# Patient Record
Sex: MALE | Employment: OTHER | URBAN - METROPOLITAN AREA
[De-identification: names, ages, dates, MRNs, and addresses within clinical notes are randomized per-mention and may not be internally consistent; named-entity substitution may affect disease eponyms.]

---

## 2022-06-06 ENCOUNTER — TELEPHONE (OUTPATIENT)
Dept: SPORTS MEDICINE | Facility: CLINIC | Age: 37
End: 2022-06-06

## 2022-06-06 DIAGNOSIS — Z02.5 SPORTS PHYSICAL: Primary | ICD-10-CM

## 2022-06-06 DIAGNOSIS — Z00.00 ROUTINE GENERAL MEDICAL EXAMINATION AT A HEALTH CARE FACILITY: Primary | ICD-10-CM

## 2022-06-13 ENCOUNTER — HOSPITAL ENCOUNTER (OUTPATIENT)
Dept: CARDIOLOGY | Facility: HOSPITAL | Age: 37
Discharge: HOME | End: 2022-06-13
Attending: INTERNAL MEDICINE

## 2022-06-13 ENCOUNTER — CLINICAL SUPPORT (OUTPATIENT)
Dept: INTERNAL MEDICINE | Facility: CLINIC | Age: 37
End: 2022-06-13

## 2022-06-13 VITALS
SYSTOLIC BLOOD PRESSURE: 118 MMHG | BODY MASS INDEX: 28.49 KG/M2 | DIASTOLIC BLOOD PRESSURE: 69 MMHG | WEIGHT: 199 LBS | HEIGHT: 70 IN | HEART RATE: 55 BPM

## 2022-06-13 DIAGNOSIS — Z00.00 ROUTINE GENERAL MEDICAL EXAMINATION AT A HEALTH CARE FACILITY: ICD-10-CM

## 2022-06-13 PROCEDURE — 94621 CARDIOPULM EXERCISE TESTING: CPT | Mod: 26,,, | Performed by: INTERNAL MEDICINE

## 2022-06-13 PROCEDURE — 94621 CARDIOPULMONARY EXERCISE TESTING (CUPID ONLY): ICD-10-PCS | Mod: 26,,, | Performed by: INTERNAL MEDICINE

## 2022-06-13 PROCEDURE — 94621 CARDIOPULM EXERCISE TESTING: CPT

## 2022-06-14 ENCOUNTER — CLINICAL SUPPORT (OUTPATIENT)
Dept: REHABILITATION | Facility: HOSPITAL | Age: 37
End: 2022-06-14
Attending: ORTHOPAEDIC SURGERY

## 2022-06-14 DIAGNOSIS — Z02.5 SPORTS PHYSICAL: ICD-10-CM

## 2022-06-14 DIAGNOSIS — M25.522 LEFT ELBOW PAIN: ICD-10-CM

## 2022-06-14 DIAGNOSIS — M25.622 ELBOW STIFFNESS, LEFT: ICD-10-CM

## 2022-06-14 PROCEDURE — 97161 PT EVAL LOW COMPLEX 20 MIN: CPT | Performed by: PHYSICAL THERAPIST

## 2022-06-15 PROBLEM — M25.622 ELBOW STIFFNESS, LEFT: Status: ACTIVE | Noted: 2022-06-15

## 2022-06-15 PROBLEM — M25.522 LEFT ELBOW PAIN: Status: ACTIVE | Noted: 2022-06-15

## 2022-06-15 NOTE — PLAN OF CARE
[facility]  Physical Therapy Initial Evaluation    Name: Nando Rizzo  Clinic Number: [id number]    Therapy Diagnosis:   Encounter Diagnoses   Name Primary?    Sports physical     Left elbow pain     Elbow stiffness, left      Physician: Armaan Gallagher MD    Physician Orders: PT Eval and Treat   Medical Diagnosis from Referral: eval and treat  Evaluation Date: 6/14/2022  Authorization Period Expiration: 6/6/23  Plan of Care Expiration: 6/15/22  Visit # / Visits authorized: 1/ 1  FOTO 1st follow up:  FOTO 2nd follow up:    Time In: 1000  Time Out: 1059  Total Billable Time: 59 minutes    Precautions: Standard,     Subjective   Date of onset: 3 months ago  History of current condition - Ned reports: L lateral elbow pain following a parachute injury. Pt is active [employer] and parachutes as part of training. L elbow pain/stiffness has impacted his ability to weight lift and perform job duties.     No past medical history on file.  Nando Rizzo  has no past surgical history on file.    Nando currently has no medications in their medication list.    Review of patient's allergies indicates:  Not on File     Imaging, none:     Prior Therapy: none  Social History:  lives alone  Occupation: [employer]  Prior Level of Function: pain-free with all ADLs and recreational activities  Current Level of Function: pain with end range L elbow flexion, weight lifting    Pain:  Current 0/10, worst 3/10, best 0/10   Location: left elbow   Description: Aching, Dull and Tight  Aggravating Factors: bending elbow, weight lifting  Easing Factors: rest    Pts goals: weight lift, job duties without pain    Objective     Objective  Observation: no gross deformity noted    Passive Range of Motion:   Elbow Left Right   Flexion 135 140   Extension 0 0   Supination 80 80   Pronation 70 70      Special Tests:  AC Joint Left Rigth   Lateral Epicondylitis/tennis elbow Passive (-) (-)   Lateral  Epicondylitis/tennis elbow Resistive (-) (-)   Medial Epicondylitis/Golfer's elbowt (-) (-)   Varus stress test (-) (-)   Valgus Stress test (-) (-)       Joint Mobility:   - Decreased left end range elbow flexion with pronation  - Decreased latissimus and teres major length on left  - Decreased lower trapezius strength  - Decreased Left hip internal rotation ROM and lateral hip flexibility  - Decreased left thoracic rotation ROM   Left elbow stability intact, no instability noted    Palpation: none    Sensation: none    TREATMENT   Treatment Time In: 1000  Treatment Time Out: 1059  Total Treatment time separate from Evaluation: 0 minutes    Ned received therapeutic exercises to develop strength, endurance, ROM and flexibility for 0 minutes including:  Tall kneeling lateral hip mobility    Half kneeling RF/hip flexor mobility on wall   Kneeling lat/teres major mobility with bench   Prone lower trap strengthening   Half kneeling thoracic rotation mobility   Foam roll to upper thoracic spine    Ned received the following manual therapy techniques: Joint mobilizations were applied to the: thoracic spine, L shoulder  for 0 minutes, including:  Thoracic manipulation, Gr V;  L shoulder inferior glide, Gr III;  Lat manual stretch with contract/relax    Home Exercises and Patient Education Provided    Education provided re: prognosis, activity modification, goals for therapy, role of therapy for care, exercises/HEP    Written Home Exercises Provided: yes.  Exercises were reviewed and Ned was able to demonstrate them prior to the end of the session.   Pt received a written copy of exercises to perform at home. Ned demonstrated good  understanding of the education provided.     See EMR under patient instructions for exercises given.   Assessment   Nando is a 37 y.o. male referred to outpatient Physical Therapy with a medical diagnosis of L elbow pain. Pt presents with decreased radial head mobility, decreased end  range elbow flexion, poor UE flexibility and pain. Elbow flexibility normalized with manual therapy to thoracic spine and lat muscle mobility with full elbow flexion by end of session. HEP provided to address deficits in UE and lower body as well.     Pt prognosis is Excellent.   Pt will benefit from skilled outpatient Physical Therapy to address the deficits stated above and in the chart below, provide pt/family education, and to maximize pt's level of independence.     Plan of care discussed with patient: Yes  Pt's spiritual, cultural and educational needs considered and patient is agreeable to the plan of care and goals as stated below:     Anticipated Barriers for therapy: none    Therapist reviewed FOTO scores for Nando Rizzo on 6/14/2022.   FOTO documents entered into [email or web address] - see Media section.    Medical Necessity is demonstrated by the following  History  Co-morbidities and personal factors that may impact the plan of care Co-morbidities:   none    Personal Factors:   no deficits     low   Examination  Body Structures and Functions, activity limitations and participation restrictions that may impact the plan of care Body Regions:   upper extremities    Body Systems:    ROM  strength  motor control  motor learning    Participation Restrictions:   none    Activity limitations:   Learning and applying knowledge  no deficits    General Tasks and Commands  no deficits    Communication  no deficits    Mobility  UE activities    Self care  no deficits    Domestic Life  no deficits    Interactions/Relationships  no deficits    Life Areas  no deficits    Community and Social Life  no deficits         low   Clinical Presentation stable and uncomplicated low   Decision Making/ Complexity Score: low     Goals:  Short Term Goals: 1 week  1. Pt will be compliant with HEP 50% of prescribed amount.   2. Decrease worst pain from 3/10 to 0/10.  3. Pt will have a full understanding of HEP.     Plan   Plan of care  Certification: 6/14/2022 to 6/15/22.    Outpatient Physical Therapy 1 times weekly for 1 weeks to include the following interventions: Manual Therapy, Neuromuscular Re-ed and Therapeutic Exercise.     Yury Lo PT, DPT

## 2022-06-16 LAB
CV STRESS BASE HR: 70 BPM
DIASTOLIC BLOOD PRESSURE: 63 MMHG
OHS CV CPX 1 MINUTE RECOVERY HEART RATE: 162 BPM
OHS CV CPX 85 PERCENT MAX PREDICTED HEART RATE MALE: 157
OHS CV CPX ANAEROBIC THRESHOLD DIASTOLIC BLOOD PRESSURE: 64 MMHG
OHS CV CPX ANAEROBIC THRESHOLD HEART RATE: 153
OHS CV CPX ANAEROBIC THRESHOLD RATE PRESSURE PRODUCT: NORMAL
OHS CV CPX ANAEROBIC THRESHOLD SYSTOLIC BLOOD PRESSURE: 147
OHS CV CPX DATA GRADE - AT: 15.9
OHS CV CPX DATA GRADE - PEAK: 24.9
OHS CV CPX DATA O2 SAT - PEAK: 97
OHS CV CPX DATA O2 SAT - REST: 98
OHS CV CPX DATA SPEED - AT: 4.2
OHS CV CPX DATA SPEED - PEAK: 6.3
OHS CV CPX DATA TIME - AT: 7.5
OHS CV CPX DATA TIME - PEAK: 1200
OHS CV CPX DATA VE/VCO2 - AT: 24
OHS CV CPX DATA VE/VCO2 - PEAK: 36
OHS CV CPX DATA VE/VO2 - AT: 22
OHS CV CPX DATA VE/VO2 - PEAK: 37
OHS CV CPX DATA VO2 - AT: 38.4
OHS CV CPX DATA VO2 - PEAK: 57.4
OHS CV CPX DATA VO2 - REST: 6.2
OHS CV CPX FEV1/FVC: 0.75
OHS CV CPX FORCED EXPIRATORY VOLUME: 4.02
OHS CV CPX FORCED VITAL CAPACITY (FVC): 5.34
OHS CV CPX HIGHEST VO: 57.4
OHS CV CPX MAX PREDICTED HEART RATE: 185
OHS CV CPX MAXIMAL VOLUNTARY VENTILATION (MVV) PREDICTED: 160.8
OHS CV CPX MAXIMAL VOLUNTARY VENTILATION (MVV): 91
OHS CV CPX MAXIUMUM EXERCISE VENTILATION (VE MAX): 172.6
OHS CV CPX PATIENT AGE: 35
OHS CV CPX PATIENT HEIGHT IN: 70
OHS CV CPX PATIENT IS FEMALE AGE 11-19: 0
OHS CV CPX PATIENT IS FEMALE AGE GREATER THAN 19: 0
OHS CV CPX PATIENT IS FEMALE AGE LESS THAN 11: 0
OHS CV CPX PATIENT IS FEMALE: 0
OHS CV CPX PATIENT IS MALE AGE 11-25: 0
OHS CV CPX PATIENT IS MALE AGE GREATER THAN 25: 1
OHS CV CPX PATIENT IS MALE AGE LESS THAN 11: 0
OHS CV CPX PATIENT IS MALE GREATER THAN 18: 1
OHS CV CPX PATIENT IS MALE LESS THAN OR EQUAL TO 18: 0
OHS CV CPX PATIENT IS MALE: 1
OHS CV CPX PATIENT WEIGHT RETURNED IN OZ: 3184
OHS CV CPX PEAK DIASTOLIC BLOOD PRESSURE: 53 MMHG
OHS CV CPX PEAK HEAR RATE: 176 BPM
OHS CV CPX PEAK RATE PRESSURE PRODUCT: NORMAL
OHS CV CPX PEAK SYSTOLIC BLOOD PRESSURE: 164 MMHG
OHS CV CPX PERCENT BODY FAT: 5.5
OHS CV CPX PERCENT MAX PREDICTED HEART RATE ACHIEVED: 95
OHS CV CPX PREDICTED VO2: 42.4 ML/KG/MIN
OHS CV CPX RATE PRESSURE PRODUCT PRESENTING: NORMAL
OHS CV CPX REST PET CO2: 34
OHS CV CPX VE/VCO2 SLOPE: 33.8
STRESS ECHO POST EXERCISE DUR MIN: 12 MINUTES
STRESS ECHO POST EXERCISE DUR SEC: 0 SECONDS
SYSTOLIC BLOOD PRESSURE: 156 MMHG